# Patient Record
Sex: MALE | Race: WHITE | NOT HISPANIC OR LATINO | URBAN - METROPOLITAN AREA
[De-identification: names, ages, dates, MRNs, and addresses within clinical notes are randomized per-mention and may not be internally consistent; named-entity substitution may affect disease eponyms.]

---

## 2022-08-28 ENCOUNTER — EMERGENCY (EMERGENCY)
Facility: HOSPITAL | Age: 62
LOS: 1 days | Discharge: HOME | End: 2022-08-28
Attending: STUDENT IN AN ORGANIZED HEALTH CARE EDUCATION/TRAINING PROGRAM
Payer: MEDICAID

## 2022-08-28 VITALS
HEIGHT: 65 IN | WEIGHT: 214.95 LBS | SYSTOLIC BLOOD PRESSURE: 116 MMHG | DIASTOLIC BLOOD PRESSURE: 78 MMHG | TEMPERATURE: 98 F | RESPIRATION RATE: 18 BRPM | HEART RATE: 94 BPM

## 2022-08-28 VITALS
OXYGEN SATURATION: 96 % | RESPIRATION RATE: 18 BRPM | DIASTOLIC BLOOD PRESSURE: 84 MMHG | TEMPERATURE: 98 F | HEART RATE: 80 BPM | SYSTOLIC BLOOD PRESSURE: 136 MMHG

## 2022-08-28 DIAGNOSIS — M66.822 SPONTANEOUS RUPTURE OF OTHER TENDONS, LEFT UPPER ARM: Chronic | ICD-10-CM

## 2022-08-28 DIAGNOSIS — Z98.89 OTHER SPECIFIED POSTPROCEDURAL STATES: Chronic | ICD-10-CM

## 2022-08-28 DIAGNOSIS — Z98.49 CATARACT EXTRACTION STATUS, UNSPECIFIED EYE: Chronic | ICD-10-CM

## 2022-08-28 DIAGNOSIS — Z87.81 PERSONAL HISTORY OF (HEALED) TRAUMATIC FRACTURE: Chronic | ICD-10-CM

## 2022-08-28 LAB
ALBUMIN SERPL ELPH-MCNC: 4.5 G/DL — SIGNIFICANT CHANGE UP (ref 3.3–5)
ALP SERPL-CCNC: 66 U/L — SIGNIFICANT CHANGE UP (ref 40–120)
ALT FLD-CCNC: 13 U/L — SIGNIFICANT CHANGE UP (ref 10–45)
ANION GAP SERPL CALC-SCNC: 9 MMOL/L — SIGNIFICANT CHANGE UP (ref 5–17)
AST SERPL-CCNC: 19 U/L — SIGNIFICANT CHANGE UP (ref 10–40)
BASOPHILS # BLD AUTO: 0.03 K/UL — SIGNIFICANT CHANGE UP (ref 0–0.2)
BASOPHILS NFR BLD AUTO: 0.2 % — SIGNIFICANT CHANGE UP (ref 0–2)
BILIRUB SERPL-MCNC: 0.5 MG/DL — SIGNIFICANT CHANGE UP (ref 0.2–1.2)
BUN SERPL-MCNC: 11 MG/DL — SIGNIFICANT CHANGE UP (ref 7–23)
CALCIUM SERPL-MCNC: 9.2 MG/DL — SIGNIFICANT CHANGE UP (ref 8.4–10.5)
CHLORIDE SERPL-SCNC: 101 MMOL/L — SIGNIFICANT CHANGE UP (ref 96–108)
CO2 SERPL-SCNC: 28 MMOL/L — SIGNIFICANT CHANGE UP (ref 22–31)
CREAT SERPL-MCNC: 0.92 MG/DL — SIGNIFICANT CHANGE UP (ref 0.5–1.3)
EGFR: 93 ML/MIN/1.73M2 — SIGNIFICANT CHANGE UP
EOSINOPHIL # BLD AUTO: 0.11 K/UL — SIGNIFICANT CHANGE UP (ref 0–0.5)
EOSINOPHIL NFR BLD AUTO: 0.9 % — SIGNIFICANT CHANGE UP (ref 0–6)
GLUCOSE SERPL-MCNC: 112 MG/DL — HIGH (ref 70–99)
HCT VFR BLD CALC: 35.5 % — LOW (ref 39–50)
HGB BLD-MCNC: 12 G/DL — LOW (ref 13–17)
IMM GRANULOCYTES NFR BLD AUTO: 0.4 % — SIGNIFICANT CHANGE UP (ref 0–1.5)
LYMPHOCYTES # BLD AUTO: 18.8 % — SIGNIFICANT CHANGE UP (ref 13–44)
LYMPHOCYTES # BLD AUTO: 2.38 K/UL — SIGNIFICANT CHANGE UP (ref 1–3.3)
MCHC RBC-ENTMCNC: 31.8 PG — SIGNIFICANT CHANGE UP (ref 27–34)
MCHC RBC-ENTMCNC: 33.8 GM/DL — SIGNIFICANT CHANGE UP (ref 32–36)
MCV RBC AUTO: 94.2 FL — SIGNIFICANT CHANGE UP (ref 80–100)
MONOCYTES # BLD AUTO: 1.06 K/UL — HIGH (ref 0–0.9)
MONOCYTES NFR BLD AUTO: 8.4 % — SIGNIFICANT CHANGE UP (ref 2–14)
NEUTROPHILS # BLD AUTO: 9.03 K/UL — HIGH (ref 1.8–7.4)
NEUTROPHILS NFR BLD AUTO: 71.3 % — SIGNIFICANT CHANGE UP (ref 43–77)
NRBC # BLD: 0 /100 WBCS — SIGNIFICANT CHANGE UP (ref 0–0)
PLATELET # BLD AUTO: 182 K/UL — SIGNIFICANT CHANGE UP (ref 150–400)
POTASSIUM SERPL-MCNC: 4 MMOL/L — SIGNIFICANT CHANGE UP (ref 3.5–5.3)
POTASSIUM SERPL-SCNC: 4 MMOL/L — SIGNIFICANT CHANGE UP (ref 3.5–5.3)
PROT SERPL-MCNC: 7.9 G/DL — SIGNIFICANT CHANGE UP (ref 6–8.3)
RBC # BLD: 3.77 M/UL — LOW (ref 4.2–5.8)
RBC # FLD: 14.3 % — SIGNIFICANT CHANGE UP (ref 10.3–14.5)
SODIUM SERPL-SCNC: 138 MMOL/L — SIGNIFICANT CHANGE UP (ref 135–145)
WBC # BLD: 12.66 K/UL — HIGH (ref 3.8–10.5)
WBC # FLD AUTO: 12.66 K/UL — HIGH (ref 3.8–10.5)

## 2022-08-28 PROCEDURE — 70487 CT MAXILLOFACIAL W/DYE: CPT | Mod: MA

## 2022-08-28 PROCEDURE — 80053 COMPREHEN METABOLIC PANEL: CPT

## 2022-08-28 PROCEDURE — 99285 EMERGENCY DEPT VISIT HI MDM: CPT

## 2022-08-28 PROCEDURE — 99284 EMERGENCY DEPT VISIT MOD MDM: CPT | Mod: 25

## 2022-08-28 PROCEDURE — 85025 COMPLETE CBC W/AUTO DIFF WBC: CPT

## 2022-08-28 PROCEDURE — 70487 CT MAXILLOFACIAL W/DYE: CPT | Mod: 26,MA

## 2022-08-28 PROCEDURE — 36415 COLL VENOUS BLD VENIPUNCTURE: CPT

## 2022-08-28 NOTE — ED PROVIDER NOTE - ATTENDING CONTRIBUTION TO CARE
Attending (Ryan Griffin M.D.):  I have personally seen and examined this patient. I have performed a substantive portion of the visit including all aspects of the medical decision making. Resident and/or ACP note reviewed. I agree on the plan of care except where noted.

## 2022-08-28 NOTE — ED ADULT NURSE NOTE - NSICDXPASTMEDICALHX_GEN_ALL_CORE_FT
PAST MEDICAL HISTORY:  Cataract left eye    Chronic pain in left shoulder     Depression     Hyperlipemia diet controlled    Hypertension     Irregular heart rhythm     Zttt-Lstzpo-Snyjki syndrome     Osteoarthritis bilat knees had cortisone inj    Osteoarthritis of back     Shoulder disorder bilaterally

## 2022-08-28 NOTE — ED ADULT NURSE NOTE - PAIN RATING/NUMBER SCALE (0-10): ACTIVITY
HR=49 bpm, SWKA=340/58 mmhg, IcG3=835.0 %, Resp=19 B/min, EtCO2=37 mmHg, Apnea=0 Seconds, Pain=0, Foreman=2, Comment=sinus dileep 4

## 2022-08-28 NOTE — ED PROVIDER NOTE - NSICDXPASTSURGICALHX_GEN_ALL_CORE_FT
PAST SURGICAL HISTORY:  H/O clavicle fracture age 15   Right    Nontraumatic rupture of bicep tendon, left 2008    S/P cataract extraction left    S/P shoulder surgery left x3  right x2    S/P tonsillectomy and adenoidectomy as a child

## 2022-08-28 NOTE — ED PROVIDER NOTE - NSICDXPASTMEDICALHX_GEN_ALL_CORE_FT
PAST MEDICAL HISTORY:  Cataract left eye    Chronic pain in left shoulder     Depression     Hyperlipemia diet controlled    Hypertension     Irregular heart rhythm     Hdav-Qucrwk-Kerwrp syndrome     Osteoarthritis bilat knees had cortisone inj    Osteoarthritis of back     Shoulder disorder bilaterally

## 2022-08-28 NOTE — ED PROVIDER NOTE - NS ED ROS FT
GENERAL: No fever, chills  EYES: no vision changes, no discharge.   ENT: no difficulty swallowing or speaking   CARDIAC: no chest pain/pressure, SOB, lower extremity swelling  PULMONARY: no cough, SOB  GI: no abdominal pain, n/v/d  : no dysuria, no hematuria  SKIN: no rashes, no ecchymosis, +L facial swelling  NEURO: no headache, lightheadedness  MSK: No joint pain, myalgia, weakness.

## 2022-08-28 NOTE — ED PROVIDER NOTE - CLINICAL SUMMARY MEDICAL DECISION MAKING FREE TEXT BOX
Weston Ocampo, DO PGY-2: 61yM with PMH of DM, HLD presents to the ED c/o 3 days L facial/lower gum pain and swelling. Pt has had dental abscesses in the past. Pt took relative amoxicillin the past few days and was prescribed augmentin and oxycodone by his dentist today and was told to f/u on Tuesday. Denies fever, n/v/d, CP, SOB, abdominal pain, difficulty swallowing. Pt with L lower gum ttp and cheek ttp. DDx includes but not limited to dental abscess, parotiditis, cellulitis, low suspicion for the Geoffrey's since no red flags. Will obtain labs, CT. Reassess.

## 2022-08-28 NOTE — ED ADULT TRIAGE NOTE - CHIEF COMPLAINT QUOTE
left sided facial swelling that began Thursday. Called dentist was prescribed Abx but no improvement.

## 2022-08-28 NOTE — ED ADULT NURSE NOTE - OBJECTIVE STATEMENT
60 y/o male coming to the ER with c/o facial swelling. A&Ox4. Ambulatory. PMH HLD, LTKR. Patient endorses thursday he began to have left sided facial swelling. Patient took 3 left over amoxicillin pills from his sister in law and reports he felt a little bit better. Patient endorses yesterday he woke up and the pain and swelling was worse, patient called his dentist who prescribed Augmentin, patient took 1 dose today. Patient has swelling and redness to the left jaw, tender to palpation. Patient denies chest pain, fever, chills, n/v/d, urinary symptoms, abdominal pain.

## 2022-08-28 NOTE — ED ADULT NURSE REASSESSMENT NOTE - NS ED NURSE REASSESS COMMENT FT1
Assumed care of pt resting in bed, NAD. Visible facial swelling noted. Airway patent, speaking clear in full sentences. RR even and unlabored. Denies difficulty swallowing, no drooling or difficulty managing secretions.

## 2022-08-28 NOTE — ED PROVIDER NOTE - PROGRESS NOTE DETAILS
Farhad SHANE PGY-3: Results explained to patient. Explained strict return precautions. Pt still able to [id number] PO. No change in phonation. Pt denies f/c. Pt denies sore throat. Informed pt to continue taking his abx. Pt states he has apt w/ dentist on tuesday. Informed pt to return if sx worsen O'Mariano DO PGY-3: received sign out on this patient.

## 2022-08-28 NOTE — ED PROVIDER NOTE - NSICDXFAMILYHX_GEN_ALL_CORE_FT
FAMILY HISTORY:  Father  Still living? Unknown  Family history of diabetes mellitus (DM), Age at diagnosis: Age Unknown    Mother  Still living? Yes, Estimated age: 71-80  Family history of acute arthritis, Age at diagnosis: Age Unknown    Sibling  Still living? Yes, Estimated age: 81-90  Family history of diabetes mellitus (DM), Age at diagnosis: Age Unknown

## 2022-08-28 NOTE — ED PROVIDER NOTE - PHYSICAL EXAMINATION
GEN: Patient awake alert NAD.   HEENT: normocephalic, atraumatic, EOMI, no scleral icterus, moist MM, L lower gum ttp and cheek ttp. no tongue elevation or submental edema or ttp, no flutuance or purulent drainage within the oral cavity.   CARDIAC: RRR, S1, S2, no murmur.   PULM: CTA B/L no wheeze, rhonchi, rales.   ABD: soft NT, ND, no rebound no guarding  MSK: Moving all extremities, no edema.    NEURO: A&Ox3, gait normal, no focal neurological deficits  SKIN: warm, dry, no rash.

## 2022-08-28 NOTE — ED PROVIDER NOTE - NSFOLLOWUPINSTRUCTIONS_ED_ALL_ED_FT
(1) Follow up with your primary care physician and dentist as discussed.     (2) Immediately seek care at your nearest emergency room if your symptoms worsen, persist, or do not resolve     (3) Take Tylenol  as needed for pain per the dosing instructions on the bottle.     (4) Continue taking your prior prescribed antibiotics   -      Dental Pain       Dental pain is often a sign that something is wrong with your teeth or gums. You can also have pain after a dental treatment. If you have dental pain, it is important to contact your dentist, especially if the cause of the pain is not known. Dental pain may hurt a lot or a little and can be caused by many things, including:  •Tooth decay (cavities or caries).      •Infection.      •The inner part of the tooth being filled with pus (an abscess).      •Injury.      •A crack in the tooth.      •Gums that move back and expose the root of a tooth.      •Gum disease.      •Abnormal grinding or clenching of teeth.      •Not taking good care of your teeth.      Sometimes the cause of pain is not known.    You may have pain all the time, or it may happen only when you are:  •Chewing.      •Exposed to hot or cold temperatures.      •Eating or drinking foods or drinks that have a lot of sugar in them, such as soda or candy.        Follow these instructions at home:    Medicines     •Take over-the-counter and prescription medicines only as told by your dentist.      •If you were prescribed an antibiotic medicine, take it as told by your dentist. Do not stop taking it even if you start to feel better.      Eating and drinking     Do not eat foods or drinks that cause you pain. These include:  •Very hot or very cold foods or drinks.      •Sweet or sugary foods or drinks.        Managing pain and swelling  •If told, put ice on the painful area of your face. To do this:  •Put ice in a plastic bag.       •Place a towel between your skin and the bag.       •Leave the ice on for 20 minutes, 2–3 times a day.      •Take off the ice if your skin turns bright red. This is very important. If you cannot feel pain, heat, or cold, you have a greater risk of damage to the area.        Brushing your teeth    •Brush your teeth twice a day using a fluoride toothpaste.       •Use a toothpaste made for sensitive teeth as told by your dentist.      •Use a soft toothbrush.      General instructions    •Floss your teeth at least once a day.      •Do not put heat on the outside of your face.      •Rinse your mouth often with salt water. To make salt water, dissolve ½–1 tsp (3–6 g) of salt in 1 cup (237 mL) of warm water.      •Watch your dental pain. Let your dentist know if there are any changes.      •Keep all follow-up visits.        Contact a dentist if:    •You have dental pain and you do not know why.      •Medicine does not help your pain.      •Your symptoms get worse.      •You have new symptoms.        Get help right away if:    •You cannot open your mouth.      •You are having trouble breathing or swallowing.      •You have a fever.      •Your face, neck, or jaw is swollen.      These symptoms may be an emergency. Get help right away. Call your local emergency services (911 in the [place]   • Do not wait to see if the symptoms will go away.       • Do not drive yourself to the hospital.         Summary    •Dental pain may be caused by many things, including tooth decay, injury, or infection. In some cases, the cause is not known.      •Dental pain may hurt a lot or very little. You may have pain all the time, or you may have it only when you eat or drink.      •Take over-the-counter and prescription medicines only as told by your dentist.      •Watch your dental pain for any changes. Let your dentist know if symptoms get worse.      This information is not intended to replace advice given to you by your health care provider. Make sure you discuss any questions you have with your health care provider.

## 2022-08-28 NOTE — ED PROVIDER NOTE - PATIENT PORTAL LINK FT
You can access the FollowMyHealth Patient Portal offered by [facility] by registering at the following website: [email or web address]. By joining [employer]’s FollowMyHealth portal, you will also be able to view your health information using other applications (apps) compatible with our system.

## 2022-08-28 NOTE — ED PROVIDER NOTE - OBJECTIVE STATEMENT
61yM with PMH of DM, HLD presents to the ED c/o 3 days L facial/lower gum pain and swelling. Pt has had dental abscesses in the past. Pt took relative amoxicillin the past few days and was prescribed augmentin and oxycodone by his dentist today and was told to f/u on Tuesday. 61yM with PMH of DM, HLD presents to the ED c/o 3 days L facial/lower gum pain and swelling. Pt has had dental abscesses in the past. Pt took relative amoxicillin the past few days and was prescribed augmentin and oxycodone by his dentist today and was told to f/u on Tuesday. Denies fever, n/v/d, CP, SOB, abdominal pain, difficulty swallowing.

## 2024-02-20 ENCOUNTER — APPOINTMENT (OUTPATIENT)
Dept: PODIATRY | Facility: CLINIC | Age: 64
End: 2024-02-20